# Patient Record
(demographics unavailable — no encounter records)

---

## 2025-06-09 NOTE — HISTORY OF PRESENT ILLNESS
[Stable] : stable [de-identified] : 66 year old female presents for initial evaluation of lower back pain x several years. The pain radiates down the bilateral legs posteriorly to the toes, with numbness/tingling. States that her legs feel weak. Laying on her back, sitting and walking aggravates the pain.  She takes naproxen, ibuprofen, tylenol for the pain. Has tried PT in 2022 but no improvement.  Has been treated with ESIs, medial branch block, and ablations with Dr. Marc since 2022 - most recent injection was 6 months ago - has only had mild temporary relief. Also had a trigger point injection with her PCP recently as well.  Ambulates with a cane.  Has MRI Lumbar done in 2021, report only.  PMHx: HTN, hypothyroidism No fever, chills, sweats, nausea/vomiting. No bowel or bladder dysfunction, no recent weight loss or gain. No night pain. This history is in addition to the intake form that I personally reviewed.

## 2025-06-09 NOTE — PHYSICAL EXAM
[Normal] : Gait: normal [Bobo's Sign] : negative Bobo's sign [Pronator Drift] : negative pronator drift [SLR] : negative straight leg raise [de-identified] : 5 out of 5 motor strength, sensation is intact and symmetrical full range of motion flexion extension and rotation, no palpatory tenderness full range of motion of hips knees shoulders and elbows (all four extremities), no atrophy, negative straight leg raise, no pathological reflexes, no swelling, normal ambulation, no apparent distress skin is intact, no palpable lymph nodes, no upper or lower extremity instability, alert and oriented x3 and normal mood. Normal finger-to nose test.  No upper motor neuron findings.  [de-identified] : I reviewed, interpreted and clinically correlated the following outside imaging studies, MRI Lumbar 12/31/21 ([facility]) - L2-3 small/moderate posterior disc bulge. L3-4 minimal anterolisthesis, mild spinal canal stenosis. L4-5 minimal anterolisthesis. Moderate posterior bulge. Severe spinal stenosis; narrow central canal, lateral recesses, and L4-5 neural foramen bilaterally. L5-S1 small posterior disc bulge. Bilateral neural foramen narrowing.

## 2025-06-09 NOTE — DISCUSSION/SUMMARY
[de-identified] : L4-5 severe stenosis- MRI 2021. L3-4 and L4-5 minimal spondylolisthesis. Discussed all options. Lumbar MRI referral to assess stenosis. F/U after MRI. Risks of surgery include infection, dural tear, nerve root injury, reherniation, future leg pain, future back pain, retained fragment, hematoma, urinary retention, worsening leg symptoms, foot drop, anesthetic risks, blood transfusion risks, positioning pain, visceral vascular injury, deep vein thrombosis, pulmonary embolus, stroke, unplanned return to OR, and death. Somatosensory evoked potentials and EMG monitoring will be used. Surgery will involve lumbar decompression and fusion with or without instrumentation, local auto bone fusion, demineralized bone matrix, and neural monitoring. Patient will need spinal orthosis pre and post operatively. All risks were explained not exclusive to the ones mentioned alternatives were discussed and all questions were answered the patient agrees and understands the above and is in complete agreement with the plan. All options discussed including rest, medicine, home exercise, acupuncture, Chiropractic care, Physical Therapy, Pain management, and last resort surgery. All questions were answered, all alternatives discussed, and the patient is in complete agreement with the treatment plan which the patient contributed to and discussed with me through the shared decision-making process. Follow-up appointment as instructed. Any issues and the patient will call or come in sooner.  agrees with plan.